# Patient Record
Sex: FEMALE | Race: BLACK OR AFRICAN AMERICAN | NOT HISPANIC OR LATINO | Employment: UNEMPLOYED | URBAN - METROPOLITAN AREA
[De-identification: names, ages, dates, MRNs, and addresses within clinical notes are randomized per-mention and may not be internally consistent; named-entity substitution may affect disease eponyms.]

---

## 2019-07-05 ENCOUNTER — HOSPITAL ENCOUNTER (EMERGENCY)
Facility: MEDICAL CENTER | Age: 54
End: 2019-07-05
Attending: EMERGENCY MEDICINE

## 2019-07-05 VITALS
RESPIRATION RATE: 16 BRPM | HEART RATE: 81 BPM | WEIGHT: 162.26 LBS | DIASTOLIC BLOOD PRESSURE: 79 MMHG | OXYGEN SATURATION: 97 % | HEIGHT: 71 IN | SYSTOLIC BLOOD PRESSURE: 133 MMHG | TEMPERATURE: 98.3 F | BODY MASS INDEX: 22.72 KG/M2

## 2019-07-05 DIAGNOSIS — H10.9 CONJUNCTIVITIS OF BOTH EYES, UNSPECIFIED CONJUNCTIVITIS TYPE: ICD-10-CM

## 2019-07-05 DIAGNOSIS — N30.00 ACUTE CYSTITIS WITHOUT HEMATURIA: ICD-10-CM

## 2019-07-05 LAB
APPEARANCE UR: CLEAR
BILIRUB UR QL STRIP.AUTO: NEGATIVE
COLOR UR: YELLOW
EPI CELLS #/AREA URNS HPF: ABNORMAL /HPF
GLUCOSE UR STRIP.AUTO-MCNC: NEGATIVE MG/DL
HCG UR QL: NEGATIVE
KETONES UR STRIP.AUTO-MCNC: NEGATIVE MG/DL
LEUKOCYTE ESTERASE UR QL STRIP.AUTO: ABNORMAL
MICRO URNS: ABNORMAL
NITRITE UR QL STRIP.AUTO: NEGATIVE
PH UR STRIP.AUTO: 6.5 [PH]
PROT UR QL STRIP: NEGATIVE MG/DL
RBC # URNS HPF: ABNORMAL /HPF
RBC UR QL AUTO: NEGATIVE
SP GR UR STRIP.AUTO: 1
UROBILINOGEN UR STRIP.AUTO-MCNC: 0.2 MG/DL
WBC #/AREA URNS HPF: ABNORMAL /HPF

## 2019-07-05 PROCEDURE — 81025 URINE PREGNANCY TEST: CPT

## 2019-07-05 PROCEDURE — 81001 URINALYSIS AUTO W/SCOPE: CPT

## 2019-07-05 PROCEDURE — 99284 EMERGENCY DEPT VISIT MOD MDM: CPT

## 2019-07-05 RX ORDER — POLYMYXIN B SULFATE AND TRIMETHOPRIM 1; 10000 MG/ML; [USP'U]/ML
1 SOLUTION OPHTHALMIC EVERY 4 HOURS
Qty: 10 ML | Refills: 0 | Status: SHIPPED | OUTPATIENT
Start: 2019-07-05 | End: 2019-07-12

## 2019-07-05 RX ORDER — SULFAMETHOXAZOLE AND TRIMETHOPRIM 800; 160 MG/1; MG/1
1 TABLET ORAL 2 TIMES DAILY
Qty: 20 TAB | Refills: 0 | Status: SHIPPED | OUTPATIENT
Start: 2019-07-05 | End: 2019-07-15

## 2019-07-05 NOTE — ED PROVIDER NOTES
ED Provider Note    Scribed for Zeb Vaughn M.D. by Kenzie Garcia. 7/5/2019, 11:45 AM.    Primary care provider: Pcp Pt States None  Means of arrival: Private Vehicle  History obtained from: Patient  History limited by: None    CHIEF COMPLAINT  Chief Complaint   Patient presents with   • Painful Urination     x 2 weeks with no relief from OTC medications.    • Abdominal Pain     lower abd pain with urination     HPI  Marina May is a 53 y.o. female with a history of kidney stones who presents to the Emergency Department complaining of intermittent urinary symptoms onset 2 weeks. Patient reports dysuria, urinary frequency, and lower abdominal pain onset 4 days ago after having the same symptoms a week prior. She treated her sympoms the first time with old Keflex that she had with complete resolution in symptoms for 1 week up until 4 days ago when her symptoms returned. She also endorses some sharp bilateral back pain, more prominently to her right side. Denies diarrhea, hematuria, or emesis.     REVIEW OF SYSTEMS  Pertinent positives include urinary frequency, abdominal pain, dysuria, back pain, eye pain. Pertinent negatives include no diarrhea, hematuria, or emesis. All other systems reviewed and negative. See HPI for further details.    PAST MEDICAL HISTORY   has a past medical history of Depression; Graves disease; Kidney stone; and Tubal ligation.    SURGICAL HISTORY  patient denies any surgical history    SOCIAL HISTORY  Social History   Substance Use Topics   • Smoking status: Current Every Day Smoker   • Smokeless tobacco: Never Used   • Alcohol use No      History   Drug Use No     Comment: used to , quit       FAMILY HISTORY  History reviewed. No pertinent family history.    CURRENT MEDICATIONS  Home Medications     Reviewed by Inna Arzate R.N. (Registered Nurse) on 07/05/19 at 1015  Med List Status: Complete   Medication Last Dose Status        Patient Jed Taking any Medications                  "      ALLERGIES  Allergies   Allergen Reactions   • Oxycontin [Kdc:Red Dye+Oxycodone+Sorbitan]        PHYSICAL EXAM  VITAL SIGNS: /85   Pulse 96   Temp 36.8 °C (98.3 °F) (Temporal)   Resp 14   Ht 1.803 m (5' 11\")   Wt 73.6 kg (162 lb 4.1 oz)   LMP 10/13/2009   SpO2 97%   BMI 22.63 kg/m²     Constitutional: Well developed, Well nourished, No acute distress, Non-toxic appearance.   HENT: Normocephalic, Atraumatic, TMs normal, mucous membranes moist, no erythema, exudates, swelling, or masses, nares patent  Eyes: Conjunctival injection and slightly milky discharge. Nonicteric  Neck: Supple, no meningismus  Lymphatic: No lymphadenopathy noted.   Cardiovascular: Regular rate and rhythm, no gallops rubs or murmurs  Lungs: Clear bilaterally   Abdomen: Mild suprapubic discomfort without rebound or guarding. No tenderness at McBurney's point. Bowel sounds normal, Soft, No tenderness, No pulsatile masses.   Skin: Warm, Dry, no rash  Back: No tenderness, No CVA tenderness.   Genitalia: Deferred  Rectal: Deferred  Extremities: No edema  Neurologic: Alert, appropriate, follows commands, moving all extremities, normal speech   Psychiatric: Affect normal      DIAGNOSTIC STUDIES / PROCEDURES    LABS  Results for orders placed or performed during the hospital encounter of 07/05/19   URINALYSIS CULTURE, IF INDICATED   Result Value Ref Range    Color Yellow     Character Clear     Specific Gravity 1.001 <1.035    Ph 6.5 5.0 - 8.0    Glucose Negative Negative mg/dL    Ketones Negative Negative mg/dL    Protein Negative Negative mg/dL    Bilirubin Negative Negative    Urobilinogen, Urine 0.2 Negative    Nitrite Negative Negative    Leukocyte Esterase Moderate (A) Negative    Occult Blood Negative Negative    Micro Urine Req Microscopic    HCG QUALITATIVE UR   Result Value Ref Range    Beta-Hcg Urine Negative Negative   URINE MICROSCOPIC (W/UA)   Result Value Ref Range    WBC 5-10 (A) /hpf    RBC 0-2 /hpf    Epithelial " Cells Few /hpf     All labs reviewed by me.    COURSE & MEDICAL DECISION MAKING  Nursing notes, VS, PMSFHx reviewed in chart.     11:45 AM - Patient seen and examined at bedside. Updated the patient on her lab results indicating there is white blood cells in her urine. Ordered for CBC with differential, BMP, urine microscope, urinalysis culture, HCG qualitative Ur, and refractometer SG to evaluate.     1:23 PM - Recheck. Updated the patient on her the rest of her labs. She will be discharged home with antibiotics for UTI and eye drops for conjunctivitis. Strict ED return precautions discussed and follow-up encouraged. Patient verbalized her understanding and agrees with the discharge instructions.       Decision Making:  This is a 53 y.o. year old female who presents with what appears to be a recurrent urinary tract infection.  She was initially having symptoms a couple weeks back and took some old antibiotics she had which appear to be Keflex.  He appeared to get better and then came back because she had been worse.  She denies any hematuria and has no evidence of blood in her urine today.  She does have an increased number of white cells in her urine.  Abdomen is benign with no evidence of focal tenderness.  She is otherwise well-appearing with stable vital signs.  I do not suspect nephrolithiasis at this point.  I do not see strong evidence of appendicitis.  Patient will be treated with Bactrim for UTI.  She also incidentally appears to have recent upper respiratory symptoms and does have a conjunctivitis.  She will be covered with Polytrim for that.    The patient will return for new or worsening symptoms and is stable at the time of discharge.    The patient is referred to a primary physician for blood pressure management, diabetic screening, and for all other preventative health concerns.      DISPOSITION:  Patient will be discharged home in stable condition.    FOLLOW UP:  [address]  [facility] [address]  [phone]  Schedule an appointment as soon as possible for a visit today        OUTPATIENT MEDICATIONS:  New Prescriptions    POLYMIXIN-TRIMETHOPRIM (POLYTRIM) 19571-7.1 UNIT/ML-% SOLUTION    Place 1 Drop in both eyes every 4 hours for 7 days.    SULFAMETHOXAZOLE-TRIMETHOPRIM (BACTRIM DS) 800-160 MG TABLET    Take 1 Tab by mouth 2 times a day for 10 days.         FINAL IMPRESSION  1. Acute cystitis without hematuria    2. Conjunctivitis of both eyes, unspecified conjunctivitis type          I, Kenzie Garcia (Scribsimona), am scribing for, and in the presence of, Zeb Vaughn M.D..    Electronically signed by: Kenzie Garcia (Lisbetibsimona), 7/5/2019    IZeb M.D. personally performed the services described in this documentation, as scribed by Kenzie Garcia in my presence, and it is both accurate and complete. C    The note accurately reflects work and decisions made by me.  Zeb Vaughn  7/5/2019  1:31 PM

## 2019-07-05 NOTE — ED TRIAGE NOTES
"Marina May  .  Chief Complaint   Patient presents with   • Painful Urination     x 2 weeks with no relief from OTC medications.    • Abdominal Pain     lower abd pain with urination     Patient to triage with above complaint. ./85   Pulse 96   Temp 36.8 °C (98.3 °F) (Temporal)   Resp 14   Ht 1.803 m (5' 11\")   Wt 73.6 kg (162 lb 4.1 oz)   LMP 10/13/2009   SpO2 97%   BMI 22.63 kg/m²     Patient to lobby and instructed to inform staff of any needs.  "